# Patient Record
Sex: MALE | Race: WHITE | URBAN - METROPOLITAN AREA
[De-identification: names, ages, dates, MRNs, and addresses within clinical notes are randomized per-mention and may not be internally consistent; named-entity substitution may affect disease eponyms.]

---

## 2018-07-30 ENCOUNTER — OFFICE VISIT (OUTPATIENT)
Dept: INTERNAL MEDICINE | Facility: CLINIC | Age: 43
End: 2018-07-30

## 2018-07-30 VITALS
RESPIRATION RATE: 18 BRPM | OXYGEN SATURATION: 98 % | HEIGHT: 73 IN | DIASTOLIC BLOOD PRESSURE: 72 MMHG | HEART RATE: 65 BPM | SYSTOLIC BLOOD PRESSURE: 108 MMHG | WEIGHT: 196 LBS | BODY MASS INDEX: 25.98 KG/M2

## 2018-07-30 DIAGNOSIS — H66.002 ACUTE SUPPURATIVE OTITIS MEDIA OF LEFT EAR WITHOUT SPONTANEOUS RUPTURE OF TYMPANIC MEMBRANE, RECURRENCE NOT SPECIFIED: Primary | ICD-10-CM

## 2018-07-30 PROCEDURE — 99213 OFFICE O/P EST LOW 20 MIN: CPT | Performed by: INTERNAL MEDICINE

## 2018-07-30 RX ORDER — AMOXICILLIN AND CLAVULANATE POTASSIUM 875; 125 MG/1; MG/1
1 TABLET, FILM COATED ORAL EVERY 12 HOURS SCHEDULED
Qty: 14 TABLET | Refills: 0 | Status: SHIPPED | OUTPATIENT
Start: 2018-07-30 | End: 2018-11-28 | Stop reason: SDUPTHER

## 2018-07-30 NOTE — PROGRESS NOTES
"Chief Complaint  Reese Johnson is a 42 y.o. male who presents for Otitis Media (Left ear pain, has had multiple surgeries on his left ear. Wears ear plugs in his left ear when he swims, was at the beach for a couple weeks. Now having jaw pain, thinks he has an ear infection. )  .    History of Present Illness   Reese is here for acute care for a new issue.  He was at the beach for two weeks.  Driving home yesterday, he noticed pain in his right ear and some dizziness.  He had had multiple ear surgeries and has to wear ear plugs when he swims.  If he doesn't wear them, he gets dizzy.  He is worried he now has an ear infection due to the pain.  He doesn't tend to develop fevers. He has had dizziness when he turns his head or sits up.       Review of Systems   Constitution: Negative for chills and fever.   HENT: Positive for ear pain. Negative for ear discharge.    Neurological: Positive for vertigo.       There is no problem list on file for this patient.      No past medical history on file.    No past surgical history on file.    No family history on file.    Social History     Social History   • Marital status:      Spouse name: N/A   • Number of children: N/A   • Years of education: N/A     Occupational History   • Not on file.     Social History Main Topics   • Smoking status: Not on file   • Smokeless tobacco: Not on file   • Alcohol use Not on file   • Drug use: Unknown   • Sexual activity: Not on file     Other Topics Concern   • Not on file     Social History Narrative   • No narrative on file       No current outpatient prescriptions on file prior to visit.     No current facility-administered medications on file prior to visit.        No Known Allergies    Vitals:    07/30/18 1117   BP: 108/72   Pulse: 65   Resp: 18   SpO2: 98%   Weight: 88.9 kg (196 lb)   Height: 185.4 cm (73\")       Body mass index is 25.86 kg/m².    Objective   Physical Exam   Constitutional: He is oriented to person, place, " and time. He appears well-developed and well-nourished. No distress.   HENT:   Head: Normocephalic and atraumatic.   Right Ear: Ear canal normal. No drainage. Tympanic membrane is injected, scarred and erythematous. Tympanic membrane is not perforated and not bulging.   Left Ear: Tympanic membrane and ear canal normal.   Neck: Normal range of motion. Neck supple.   Lymphadenopathy:     He has no cervical adenopathy.   Neurological: He is alert and oriented to person, place, and time. No cranial nerve deficit.       Results for orders placed or performed in visit on 11/20/15   CBC and Differential   Result Value Ref Range    WBC 7.67 4.50 - 10.70 K/Cumm    RBC 5.50 4.60 - 6.00 Million    Hemoglobin 17.2 13.7 - 17.6 g/dL    Hematocrit 49.0 40.4 - 52.2 %    MCV 89.1 79.8 - 96.2 fL    MCH 31.3 27.0 - 32.7 pg    MCHC 35.1 32.6 - 36.4 g/dL    RDW 12.4 11.5 - 14.5 %    Platelets 259 140 - 500 K/Cumm    Neutrophil Rel % 46.8 42.7 - 76.0 %    Lymphocyte Rel % 39.2 19.6 - 45.3 %    Monocyte Rel % 10.4 5.0 - 12.0 %    Eosinophil Rel % 2.3 0.3 - 6.2 %    Basophil Rel % 0.5 0.0 - 1.5 %    Neutrophils Absolute 3.6 1.9 - 8.1 K/Cumm    Lymphocytes Absolute 3.0 0.9 - 4.8 K/Cumm    Monocytes Absolute 0.8 0.2 - 1.2 K/Cumm    Eosinophils Absolute 0.2 0.0 - 0.7 K/Cumm    Basophils Absolute 0.0 0.0 - 0.2 K/Cumm    Immature Granulocyte Rel % 0.8 (H) 0.0 - 0.6 %    Immature Grans Absolute 0.1 0.00 - 0.64 K/Cumm       Assessment/Plan   Diagnoses and all orders for this visit:    Acute suppurative otitis media of left ear without spontaneous rupture of tympanic membrane, recurrence not specified    Other orders  -     amoxicillin-clavulanate (AUGMENTIN) 875-125 MG per tablet; Take 1 tablet by mouth Every 12 (Twelve) Hours.        Discussion/Summary  Reese is here for acute care.  He has a lot of scarring in his right ear as well as some erythema.  Given his symptoms, will go ahead and treat and a mild otitis media.  He is a physical  therapist.  He will try to do some vestibular therapy for his dizziness.  RTC if no improvement.    No Follow-up on file.

## 2018-11-28 ENCOUNTER — OFFICE VISIT (OUTPATIENT)
Dept: INTERNAL MEDICINE | Facility: CLINIC | Age: 43
End: 2018-11-28

## 2018-11-28 VITALS
HEART RATE: 78 BPM | WEIGHT: 195 LBS | SYSTOLIC BLOOD PRESSURE: 128 MMHG | BODY MASS INDEX: 25.84 KG/M2 | RESPIRATION RATE: 18 BRPM | HEIGHT: 73 IN | OXYGEN SATURATION: 98 % | DIASTOLIC BLOOD PRESSURE: 70 MMHG

## 2018-11-28 DIAGNOSIS — H66.005 RECURRENT ACUTE SUPPURATIVE OTITIS MEDIA WITHOUT SPONTANEOUS RUPTURE OF LEFT TYMPANIC MEMBRANE: Primary | ICD-10-CM

## 2018-11-28 PROCEDURE — 99213 OFFICE O/P EST LOW 20 MIN: CPT | Performed by: INTERNAL MEDICINE

## 2018-11-28 RX ORDER — AMOXICILLIN AND CLAVULANATE POTASSIUM 875; 125 MG/1; MG/1
1 TABLET, FILM COATED ORAL EVERY 12 HOURS SCHEDULED
Qty: 14 TABLET | Refills: 0 | Status: SHIPPED | OUTPATIENT
Start: 2018-11-28 | End: 2019-02-19

## 2018-11-28 NOTE — PROGRESS NOTES
Chief Complaint  Reese Johnson is a 43 y.o. male who presents for Earache (Left ear pain. Uses ear plugs while in water, sometimes gets ear infections after using ear plugs. )  .    History of Present Illness   Reese is here for acute care.  He went surfing last week and got water in his ear.  He wears ear plugs due to the surgeries he has had for cholesteotoma removal.  He has noticed a progressive heaviness in his left ear and discomfort which happens when he's developing otitis.  He does see his ENT yearly.  No fever or chills.      Review of Systems   Constitution: Negative for chills and fever.   HENT: Positive for ear pain and hearing loss. Negative for ear discharge.        There is no problem list on file for this patient.      No past medical history on file.    No past surgical history on file.    No family history on file.    Social History     Socioeconomic History   • Marital status:      Spouse name: Not on file   • Number of children: Not on file   • Years of education: Not on file   • Highest education level: Not on file   Social Needs   • Financial resource strain: Not on file   • Food insecurity - worry: Not on file   • Food insecurity - inability: Not on file   • Transportation needs - medical: Not on file   • Transportation needs - non-medical: Not on file   Occupational History   • Not on file   Tobacco Use   • Smoking status: Not on file   Substance and Sexual Activity   • Alcohol use: Not on file   • Drug use: Not on file   • Sexual activity: Not on file   Other Topics Concern   • Not on file   Social History Narrative   • Not on file       Current Outpatient Medications on File Prior to Visit   Medication Sig Dispense Refill   • [DISCONTINUED] amoxicillin-clavulanate (AUGMENTIN) 875-125 MG per tablet Take 1 tablet by mouth Every 12 (Twelve) Hours. 14 tablet 0     No current facility-administered medications on file prior to visit.        No Known Allergies    Vitals:    11/28/18  "0940   BP: 128/70   Pulse: 78   Resp: 18   SpO2: 98%   Weight: 88.5 kg (195 lb)   Height: 185.4 cm (72.99\")       Body mass index is 25.73 kg/m².    Objective   Physical Exam   Constitutional: He is oriented to person, place, and time. He appears well-developed and well-nourished.   HENT:   Head: Normocephalic and atraumatic.   Right Ear: Tympanic membrane normal.   Left Ear: No drainage. Tympanic membrane is scarred and erythematous.   Eyes: Conjunctivae are normal. No scleral icterus.   Neck: Normal range of motion. Neck supple.   Pulmonary/Chest: Effort normal. No respiratory distress.   Lymphadenopathy:     He has no cervical adenopathy.   Neurological: He is alert and oriented to person, place, and time.       Results for orders placed or performed in visit on 11/20/15   CBC and Differential   Result Value Ref Range    WBC 7.67 4.50 - 10.70 K/Cumm    RBC 5.50 4.60 - 6.00 Million    Hemoglobin 17.2 13.7 - 17.6 g/dL    Hematocrit 49.0 40.4 - 52.2 %    MCV 89.1 79.8 - 96.2 fL    MCH 31.3 27.0 - 32.7 pg    MCHC 35.1 32.6 - 36.4 g/dL    RDW 12.4 11.5 - 14.5 %    Platelets 259 140 - 500 K/Cumm    Neutrophil Rel % 46.8 42.7 - 76.0 %    Lymphocyte Rel % 39.2 19.6 - 45.3 %    Monocyte Rel % 10.4 5.0 - 12.0 %    Eosinophil Rel % 2.3 0.3 - 6.2 %    Basophil Rel % 0.5 0.0 - 1.5 %    Neutrophils Absolute 3.6 1.9 - 8.1 K/Cumm    Lymphocytes Absolute 3.0 0.9 - 4.8 K/Cumm    Monocytes Absolute 0.8 0.2 - 1.2 K/Cumm    Eosinophils Absolute 0.2 0.0 - 0.7 K/Cumm    Basophils Absolute 0.0 0.0 - 0.2 K/Cumm    Immature Granulocyte Rel % 0.8 (H) 0.0 - 0.6 %    Immature Grans Absolute 0.1 0.00 - 0.64 K/Cumm       Assessment/Plan   Diagnoses and all orders for this visit:    Recurrent acute suppurative otitis media without spontaneous rupture of left tympanic membrane    Other orders  -     amoxicillin-clavulanate (AUGMENTIN) 875-125 MG per tablet; Take 1 tablet by mouth Every 12 (Twelve) Hours.        Discussion/Summary  Reese is " here for acute care.  Will start him on Augmentin which has worked well for him in the past.  Advised to follow up with his ENT if his symptoms do not clear.    No Follow-up on file.

## 2019-02-19 ENCOUNTER — OFFICE VISIT (OUTPATIENT)
Dept: INTERNAL MEDICINE | Facility: CLINIC | Age: 44
End: 2019-02-19

## 2019-02-19 VITALS
BODY MASS INDEX: 25.84 KG/M2 | WEIGHT: 195 LBS | DIASTOLIC BLOOD PRESSURE: 64 MMHG | HEIGHT: 73 IN | SYSTOLIC BLOOD PRESSURE: 102 MMHG | OXYGEN SATURATION: 99 % | HEART RATE: 75 BPM | RESPIRATION RATE: 18 BRPM

## 2019-02-19 DIAGNOSIS — L42 PITYRIASIS ROSEA: Primary | ICD-10-CM

## 2019-02-19 PROCEDURE — 99213 OFFICE O/P EST LOW 20 MIN: CPT | Performed by: INTERNAL MEDICINE

## 2019-02-19 RX ORDER — METHYLPREDNISOLONE 4 MG/1
TABLET ORAL
Qty: 21 TABLET | Refills: 0 | Status: SHIPPED | OUTPATIENT
Start: 2019-02-19

## 2019-02-19 RX ORDER — KETOROLAC TROMETHAMINE 5 MG/ML
SOLUTION OPHTHALMIC
COMMUNITY
Start: 2018-12-13 | End: 2019-02-19

## 2019-02-19 RX ORDER — GENTAMICIN SULFATE 3 MG/ML
SOLUTION/ DROPS OPHTHALMIC
COMMUNITY
Start: 2018-12-13 | End: 2019-02-19

## 2019-02-19 NOTE — PROGRESS NOTES
Chief Complaint  Reese Johnson is a 43 y.o. male who presents for Rash (arms, chest and back, started as a small spot but keeps progressing. Doesn't bother him too much, is slightly itchy, but looks bad. Starts a week ago. )  .    History of Present Illness   Reese is here for acute care for a new issue.  He developed a rash that started as one spot on his lower abd.  It then spread to his whole abd/chest and now to his back and some on his upper arms.  It's not super itchy but just feels like he has an uncomfortable sweater on.  No fever or chills.       Review of Systems   Constitution: Negative for chills and fever.   Skin: Positive for rash. Negative for itching.       There is no problem list on file for this patient.      No past medical history on file.    No past surgical history on file.    No family history on file.    Social History     Socioeconomic History   • Marital status:      Spouse name: Not on file   • Number of children: Not on file   • Years of education: Not on file   • Highest education level: Not on file   Social Needs   • Financial resource strain: Not on file   • Food insecurity - worry: Not on file   • Food insecurity - inability: Not on file   • Transportation needs - medical: Not on file   • Transportation needs - non-medical: Not on file   Occupational History   • Not on file   Tobacco Use   • Smoking status: Not on file   Substance and Sexual Activity   • Alcohol use: Not on file   • Drug use: Not on file   • Sexual activity: Not on file   Other Topics Concern   • Not on file   Social History Narrative   • Not on file       Current Outpatient Medications on File Prior to Visit   Medication Sig Dispense Refill   • [DISCONTINUED] amoxicillin-clavulanate (AUGMENTIN) 875-125 MG per tablet Take 1 tablet by mouth Every 12 (Twelve) Hours. 14 tablet 0   • [DISCONTINUED] gentamicin (GARAMYCIN) 0.3 % ophthalmic solution      • [DISCONTINUED] ketorolac (ACULAR) 0.5 % ophthalmic  "solution        No current facility-administered medications on file prior to visit.        No Known Allergies    Vitals:    02/19/19 1129   BP: 102/64   Pulse: 75   Resp: 18   SpO2: 99%   Weight: 88.5 kg (195 lb)   Height: 185.4 cm (72.99\")       Body mass index is 25.73 kg/m².    Objective   Physical Exam   Constitutional: He is oriented to person, place, and time. He appears well-developed and well-nourished. No distress.   Neck: Normal range of motion. Neck supple.   Pulmonary/Chest: Effort normal. No respiratory distress.   Neurological: He is alert and oriented to person, place, and time. No cranial nerve deficit.   Skin: Rash (red/pink, slighly raised oval lesions on abd/chest/back/upper back, top of arms.  ) noted.       Results for orders placed or performed in visit on 11/20/15   CBC and Differential   Result Value Ref Range    WBC 7.67 4.50 - 10.70 K/Cumm    RBC 5.50 4.60 - 6.00 Million    Hemoglobin 17.2 13.7 - 17.6 g/dL    Hematocrit 49.0 40.4 - 52.2 %    MCV 89.1 79.8 - 96.2 fL    MCH 31.3 27.0 - 32.7 pg    MCHC 35.1 32.6 - 36.4 g/dL    RDW 12.4 11.5 - 14.5 %    Platelets 259 140 - 500 K/Cumm    Neutrophil Rel % 46.8 42.7 - 76.0 %    Lymphocyte Rel % 39.2 19.6 - 45.3 %    Monocyte Rel % 10.4 5.0 - 12.0 %    Eosinophil Rel % 2.3 0.3 - 6.2 %    Basophil Rel % 0.5 0.0 - 1.5 %    Neutrophils Absolute 3.6 1.9 - 8.1 K/Cumm    Lymphocytes Absolute 3.0 0.9 - 4.8 K/Cumm    Monocytes Absolute 0.8 0.2 - 1.2 K/Cumm    Eosinophils Absolute 0.2 0.0 - 0.7 K/Cumm    Basophils Absolute 0.0 0.0 - 0.2 K/Cumm    Immature Granulocyte Rel % 0.8 (H) 0.0 - 0.6 %    Immature Grans Absolute 0.1 0.00 - 0.64 K/Cumm       Assessment/Plan   Diagnoses and all orders for this visit:    Pityriasis rosea    Other orders  -     Discontinue: ketorolac (ACULAR) 0.5 % ophthalmic solution;   -     Discontinue: gentamicin (GARAMYCIN) 0.3 % ophthalmic solution;   -     methylPREDNISolone (MEDROL) 4 MG tablet; follow package " directions        Discussion/Summary  Reese is here for acute care for a new issue.  This is most likely pityriasis.  He started with a herald lesion.  We discussed that the etiology is unclear but often presumed to be viral.  Will see if a medrol dose pack helps with some of the itchy feeling.  Ok to use cetaphil or eucerin on it for relief as well.    No Follow-up on file.